# Patient Record
Sex: MALE | Race: BLACK OR AFRICAN AMERICAN | Employment: OTHER | URBAN - METROPOLITAN AREA
[De-identification: names, ages, dates, MRNs, and addresses within clinical notes are randomized per-mention and may not be internally consistent; named-entity substitution may affect disease eponyms.]

---

## 2017-06-22 ENCOUNTER — HOSPITAL ENCOUNTER (OUTPATIENT)
Dept: MRI IMAGING | Age: 67
Discharge: HOME | End: 2017-06-22
Attending: ORTHOPAEDIC SURGERY
Payer: MEDICARE

## 2017-06-22 DIAGNOSIS — M25.511 RIGHT SHOULDER PAIN: ICD-10-CM

## 2017-06-22 PROCEDURE — 73221 MRI JOINT UPR EXTREM W/O DYE: CPT

## 2019-08-29 ENCOUNTER — OP HISTORICAL/CONVERTED ENCOUNTER (OUTPATIENT)
Dept: OTHER | Age: 69
End: 2019-08-29

## 2020-05-18 ENCOUNTER — HOSPITAL ENCOUNTER (OUTPATIENT)
Dept: GENERAL RADIOLOGY | Age: 70
Discharge: HOME | End: 2020-05-18
Payer: MEDICARE

## 2020-05-18 DIAGNOSIS — R52 PAIN: ICD-10-CM

## 2020-05-18 DIAGNOSIS — R60.9 SWELLING: ICD-10-CM

## 2020-05-18 PROCEDURE — 73130 X-RAY EXAM OF HAND: CPT

## 2023-08-09 ENCOUNTER — HOSPITAL ENCOUNTER (EMERGENCY)
Facility: HOSPITAL | Age: 73
Discharge: HOME | End: 2023-08-09
Attending: EMERGENCY MEDICINE
Payer: MEDICARE

## 2023-08-09 ENCOUNTER — APPOINTMENT (OUTPATIENT)
Facility: HOSPITAL | Age: 73
End: 2023-08-09
Payer: MEDICARE

## 2023-08-09 VITALS
TEMPERATURE: 98.6 F | WEIGHT: 260 LBS | HEART RATE: 83 BPM | OXYGEN SATURATION: 98 % | BODY MASS INDEX: 38.51 KG/M2 | RESPIRATION RATE: 18 BRPM | DIASTOLIC BLOOD PRESSURE: 76 MMHG | HEIGHT: 69 IN | SYSTOLIC BLOOD PRESSURE: 165 MMHG

## 2023-08-09 DIAGNOSIS — K59.00 CONSTIPATION, UNSPECIFIED CONSTIPATION TYPE: Primary | ICD-10-CM

## 2023-08-09 PROCEDURE — 99283 EMERGENCY DEPT VISIT LOW MDM: CPT

## 2023-08-09 PROCEDURE — 74018 RADEX ABDOMEN 1 VIEW: CPT

## 2023-08-09 PROCEDURE — 6370000000 HC RX 637 (ALT 250 FOR IP): Performed by: EMERGENCY MEDICINE

## 2023-08-09 RX ORDER — BISACODYL 10 MG
10 SUPPOSITORY, RECTAL RECTAL DAILY
Qty: 10 SUPPOSITORY | Refills: 0 | Status: SHIPPED | OUTPATIENT
Start: 2023-08-09 | End: 2023-08-19

## 2023-08-09 RX ORDER — ENEMA 19; 7 G/133ML; G/133ML
1 ENEMA RECTAL ONCE
Status: COMPLETED | OUTPATIENT
Start: 2023-08-09 | End: 2023-08-09

## 2023-08-09 RX ADMIN — SODIUM PHOSPHATE, DIBASIC AND SODIUM PHOSPHATE, MONOBASIC 1 ENEMA: 7; 19 ENEMA RECTAL at 22:23

## 2023-08-09 ASSESSMENT — PAIN SCALES - GENERAL: PAINLEVEL_OUTOF10: 8

## 2023-08-09 ASSESSMENT — LIFESTYLE VARIABLES
HOW MANY STANDARD DRINKS CONTAINING ALCOHOL DO YOU HAVE ON A TYPICAL DAY: PATIENT DOES NOT DRINK
HOW OFTEN DO YOU HAVE A DRINK CONTAINING ALCOHOL: NEVER

## 2023-08-09 ASSESSMENT — PAIN DESCRIPTION - LOCATION: LOCATION: RECTUM

## 2023-08-09 ASSESSMENT — PAIN - FUNCTIONAL ASSESSMENT: PAIN_FUNCTIONAL_ASSESSMENT: 0-10

## 2023-08-09 ASSESSMENT — PAIN DESCRIPTION - DESCRIPTORS: DESCRIPTORS: PRESSURE

## 2023-08-09 NOTE — ED TRIAGE NOTES
Patient arrives to ed via pov with c/o unable to get bowel movement out. Pt sts he has been having constipation issues and was placed on a laxative for the last month but had been weaning self off this weekend. Pt sts he feels like its there but cant get it out. Pt sts he has been passing gas still. Pt sts he is having rectal pain. Pt denies blood in stool. Sts LBM was 3 days ago.

## 2023-08-10 NOTE — DISCHARGE INSTRUCTIONS
Please restart your prior regimen for constipation as prescribed by your PCP. You can use Dulcolax suppositories as needed until you have a bowel movement. Thank you.

## 2023-08-10 NOTE — ED NOTES
Pt given discharge instructions, patient education, prescriptions, and follow up information. Pt verbalizes understanding. All questions answered. Patient discharged to home in private vehicle, ambulatory. Pt A&Ox4, RA, pain controlled.       Segundo Noyola RN  08/09/23 [id number]

## 2024-10-17 ENCOUNTER — TRANSCRIBE ORDERS (OUTPATIENT)
Facility: HOSPITAL | Age: 74
End: 2024-10-17

## 2024-10-17 DIAGNOSIS — I89.0 LYMPHEDEMA: Primary | ICD-10-CM

## 2025-02-04 ENCOUNTER — HOSPITAL ENCOUNTER (OUTPATIENT)
Facility: HOSPITAL | Age: 75
Discharge: HOME | End: 2025-02-07
Payer: MEDICARE

## 2025-02-04 DIAGNOSIS — I47.29 NONSUSTAINED VENTRICULAR TACHYCARDIA (HCC): ICD-10-CM

## 2025-02-04 DIAGNOSIS — I50.31 ACUTE HEART FAILURE WITH PRESERVED EJECTION FRACTION (HCC): ICD-10-CM

## 2025-02-04 DIAGNOSIS — I50.22 CHRONIC SYSTOLIC CONGESTIVE HEART FAILURE (HCC): ICD-10-CM

## 2025-02-04 DIAGNOSIS — I11.9 MALIGNANT HYPERTENSIVE HEART DISEASE WITHOUT HEART FAILURE: ICD-10-CM

## 2025-02-04 PROCEDURE — A9538 TC99M PYROPHOSPHATE: HCPCS | Performed by: STUDENT IN AN ORGANIZED HEALTH CARE EDUCATION/TRAINING PROGRAM

## 2025-02-04 PROCEDURE — 3430000000 HC RX DIAGNOSTIC RADIOPHARMACEUTICAL: Performed by: STUDENT IN AN ORGANIZED HEALTH CARE EDUCATION/TRAINING PROGRAM

## 2025-02-04 PROCEDURE — 78803 RP LOCLZJ TUM SPECT 1 AREA: CPT

## 2025-02-04 RX ORDER — TECHNETIUM TC99M PYROPHOSPHATE 12 MG/10ML
15 INJECTION INTRAVENOUS ONCE
Status: COMPLETED | OUTPATIENT
Start: 2025-02-04 | End: 2025-02-04

## 2025-02-04 RX ADMIN — TECHNETIUM TC99M PYROPHOSPHATE 15 MILLICURIE: 12 INJECTION INTRAVENOUS at 10:27

## 2025-03-03 ENCOUNTER — HOSPITAL ENCOUNTER (OUTPATIENT)
Facility: HOSPITAL | Age: 75
Setting detail: RECURRING SERIES
Discharge: HOME | End: 2025-03-06
Payer: MEDICARE

## 2025-03-03 PROCEDURE — 97140 MANUAL THERAPY 1/> REGIONS: CPT

## 2025-03-03 PROCEDURE — 97535 SELF CARE MNGMENT TRAINING: CPT

## 2025-03-03 PROCEDURE — 97162 PT EVAL MOD COMPLEX 30 MIN: CPT

## 2025-03-03 NOTE — THERAPY EVALUATION
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 []   I87.2 Venous Insufficiency   [x]   I89.0 Lymphedema, other secondary  []   I83.019 Venous Stasis Ulcer LE, Right   []   I89.9 Unspecified Lymphatic Disorder []   I83.029 Venous Stasis Ulcer LE, Left   [x]   R60.9 Swelling not relieved by elevation []   Q82.0 Hereditary/ Congenital Lymphedema   []   C50.211 Malignant neoplasm of breast, Right []   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   []   C50.212 Malignant neoplasm of breast, Left []   L03.115 LE Cellulitis, Right   []    []   L03.116 LE Cellulitis, Left                                                           Yoav Ghosh    1950  Page 2 of 2    Physician Order for DME for Diagnosis of Secondary Lymphedema as Listed on Statement of Medical Necessity, Page 1        Recommended Product:  Units Upper Extremity Rt Lt Units Lower Extremity Rt Lt    Circ-Aid, Ready Wrap, Sigvaris Arm   4 Inelastic binders (Circ-Aid, Farrow)  [x]Foot   [x]Below Knee   []Knee   []Thigh x x    Circ-Aid Ready Wrap, Sigvaris hand    Elpidio Bradford, night use []Full Leg  []Lower Leg      Tribute Arm, night use   2 Tribute, night use  [x]Full Leg  []Lower Leg x x    Elpidio Bradford Arm, night use    Austyn Sleeve Leg/ Foot, night use      Gradiant Compression Sleeves & Gloves  []Custom [] RM Arm:  []CCL1 []CCL2 []CCL3  []Custom [] RM Glove: []CCL1 []CCL2 []CCL3     6 Gradient Compression Stockings   [x]Custom  []RM Lower Extremity:   []CCL1       [x]CCL2         []CCL3   [x]Knee       []Thigh        []Waist Length x x    Austyn sleeve arm w/ hand, night use    Tribute Wrap, night use      Compression Bra          Compression Vest         The above patient was referred for treatment of Lymphedema due to 
must be completed for valid certification **  Please sign and fax to [phone].  Thank you

## 2025-03-07 ENCOUNTER — TRANSCRIBE ORDERS (OUTPATIENT)
Facility: HOSPITAL | Age: 75
End: 2025-03-07

## 2025-03-07 DIAGNOSIS — I50.30 HEART FAILURE WITH PRESERVED EJECTION FRACTION, UNSPECIFIED HF CHRONICITY (HCC): Primary | ICD-10-CM

## 2025-03-14 ENCOUNTER — HOSPITAL ENCOUNTER (OUTPATIENT)
Facility: HOSPITAL | Age: 75
Discharge: HOME | End: 2025-03-16
Payer: MEDICARE

## 2025-03-14 DIAGNOSIS — I50.30 HEART FAILURE WITH PRESERVED EJECTION FRACTION, UNSPECIFIED HF CHRONICITY (HCC): ICD-10-CM

## 2025-03-14 LAB
ECHO AO ASC DIAM: 2.4 CM
ECHO AO ROOT DIAM: 2.5 CM
ECHO LA DIAMETER: 3.6 CM
ECHO LA TO AORTIC ROOT RATIO: 1.44
ECHO LV E' LATERAL VELOCITY: 3.15 CM/S
ECHO LV E' SEPTAL VELOCITY: 4.35 CM/S
ECHO LV EDV A2C: 31 ML
ECHO LV EF PHYSICIAN: 67 %
ECHO LV EJECTION FRACTION A2C: 45 %
ECHO LV ESV A2C: 17 ML
ECHO LV FRACTIONAL SHORTENING: 28 % (ref 28–44)
ECHO LV INTERNAL DIMENSION DIASTOLIC: 4.6 CM (ref 4.2–5.9)
ECHO LV INTERNAL DIMENSION SYSTOLIC: 3.3 CM
ECHO LV IVSD: 1.4 CM (ref 0.6–1)
ECHO LV MASS 2D: 270.6 G (ref 88–224)
ECHO LV POSTERIOR WALL DIASTOLIC: 1.5 CM (ref 0.6–1)
ECHO LV RELATIVE WALL THICKNESS RATIO: 0.65
ECHO LVOT AREA: 5.7 CM2
ECHO LVOT DIAM: 2.7 CM
ECHO LVOT MEAN GRADIENT: 1 MMHG
ECHO LVOT PEAK GRADIENT: 3 MMHG
ECHO LVOT PEAK VELOCITY: 0.9 M/S
ECHO LVOT SV: 133.9 ML
ECHO LVOT VTI: 23.4 CM
ECHO MV A VELOCITY: 1.25 M/S
ECHO MV E DECELERATION TIME (DT): 215 MS
ECHO MV E VELOCITY: 0.99 M/S
ECHO MV E/A RATIO: 0.79
ECHO MV E/E' LATERAL: 31.43
ECHO MV E/E' RATIO (AVERAGED): 27.09
ECHO MV E/E' SEPTAL: 22.76
ECHO MV REGURGITANT PEAK GRADIENT: 3 MMHG
ECHO MV REGURGITANT PEAK VELOCITY: 0.9 M/S
ECHO MV REGURGITANT VTIA: 25.9 CM
ECHO PV MAX VELOCITY: 1 M/S
ECHO PV MEAN GRADIENT: 2 MMHG
ECHO PV MEAN VELOCITY: 0.7 M/S
ECHO PV PEAK GRADIENT: 4 MMHG
ECHO PV VTI: 17.9 CM
ECHO RV BASAL DIMENSION: 3.1 CM
ECHO RV MID DIMENSION: 2.3 CM
ECHO TV REGURGITANT MAX VELOCITY: 1.95 M/S
ECHO TV REGURGITANT PEAK GRADIENT: 15 MMHG

## 2025-03-14 PROCEDURE — C8929 TTE W OR WO FOL WCON,DOPPLER: HCPCS

## 2025-03-14 PROCEDURE — 6360000004 HC RX CONTRAST MEDICATION

## 2025-03-14 RX ADMIN — SULFUR HEXAFLUORIDE 2 ML: 60.7; .19; .19 INJECTION, POWDER, LYOPHILIZED, FOR SUSPENSION INTRAVENOUS; INTRAVESICAL at 16:32

## 2025-03-28 ENCOUNTER — HOSPITAL ENCOUNTER (OUTPATIENT)
Facility: HOSPITAL | Age: 75
Setting detail: RECURRING SERIES
Discharge: HOME | End: 2025-03-31
Payer: MEDICARE

## 2025-03-28 PROCEDURE — 97140 MANUAL THERAPY 1/> REGIONS: CPT

## 2025-03-28 NOTE — PROGRESS NOTES
PHYSICAL THERAPY/OCCUPATIONAL THERAPY - MEDICARE DAILY TREATMENT NOTE (updated 3/23)      Date: 3/28/2025          Patient Name:  Yoav Ghosh :  1950   Medical   Diagnosis:  No admission diagnoses are documented for this encounter. Treatment Diagnosis:  I89.0     Lymphedema, not elsewhere classified and R60.9     Edema, unspecified    Referral Source:  Faustino Campbell* Insurance:   Payor: [facility] MEDICARE / Plan: [facility] HEALTHKEEPERS MEDIBLUE PLUS / Product Type: *No Product type* /                     Patient  verified yes     Visit #   Current  / Total 2 24 per POC   Time   In / Out 1220 1320   Total Treatment Time 60   Total Timed Codes 60   1:1 Treatment Time 60      [facility] Totals Reminder:  bill using total billable   min of TIMED therapeutic procedures and modalities.   8-22 min = 1 unit; 23-37 min = 2 units; 38-52 min = 3 units;  53-67 min = 4 units; 68-82 min = 5 units         SUBJECTIVE    Pain Level (0-10 scale): pt denies pain at rest    Any medication changes, allergies to medications, adverse drug reactions, diagnosis change, or new procedure performed?: [x] No    [] Yes (see summary sheet for update)  Medications: Verified on Patient Summary List    Subjective functional status/changes:     Pt arrives to appt accompanied by his wife. Pt received bandages and agreeable to initiate bandaging treatment today. Pt has continued to wear OTS knee high stockings but feels legs are more swollen.     OBJECTIVE      Therapeutic Procedures:  Tx Min Billable or 1:1 Min (if diff from Tx Min) Procedure, Rationale, Specifics   60  [id number] Manual Therapy (timed):  decrease pain, increase ROM, increase tissue extensibility, and decrease edema to improve patient's ability to progress to PLOF and address remaining functional goals.  The manual therapy interventions were performed at a separate and distinct time from the therapeutic activities interventions . (see flow sheet as

## 2025-04-02 ENCOUNTER — HOSPITAL ENCOUNTER (OUTPATIENT)
Facility: HOSPITAL | Age: 75
Setting detail: RECURRING SERIES
End: 2025-04-02
Payer: MEDICARE

## 2025-04-04 ENCOUNTER — HOSPITAL ENCOUNTER (OUTPATIENT)
Facility: HOSPITAL | Age: 75
Setting detail: RECURRING SERIES
Discharge: HOME | End: 2025-04-07
Payer: MEDICARE

## 2025-04-04 PROCEDURE — 97140 MANUAL THERAPY 1/> REGIONS: CPT

## 2025-04-04 NOTE — PROGRESS NOTES
[facility]   a part of [facility]  [address], [address]   [place] [address]   Phone: [phone]  Fax: [phone]      PHYSICAL THERAPY PROGRESS NOTE  Patient Name:  Yoav Ghosh :  1950   Treatment/Medical Diagnosis: No admission diagnoses are documented for this encounter.   Referral Source:  Faustino Campbell*     Date of Initial Visit:  3/3/25 Attended Visits:  3 Missed Visits:  0     SUMMARY OF TREATMENT/ASSESSMENT:  Pt with good tolerance to MLD and bilateral LE knee high bandaging this visit. No SOB noted and pt reports bandages \"felt good\" after application. Note reduction in ankle girth measurements by 4 cm this visit. Reviewed bandaging precautions and advised pt to remove bandages with any increased SOB, pain, numbness/tingling. Will continue MLB 2x/week and measure for garments once volumes reach a plateau. Pt is progressing towards all PT goals.     CURRENT STATUS/GOALS    Patient will demonstrate decreased volumetric measurements by 800 ml bilateral lower extremities, in order to reduce risk for infection, decrease feeling of limb heaviness, and increase independence/tolerance for walking x 10 min within 6 weeks.   Status at last Eval/Progress Note: not met   Current Status: ongoing   Goal Met?  no    2.  Patient to perform 5/5 lymphedema remedial exercises in session with modified independence utilizing HEP handout, in order to promote optimal independence with management of condition, as well as promote optimal limb volume reduction required for proper fit of donned clothing in 6 weeks.   Status at last Eval/Progress Note: not met   Current Status: ongoing   Goal Met?  no    3. Patient/Caregiver to verbalize 3/3 signs and symptoms of infection without external cueing, in order to promote optimal self-management of condition in 6 weeks.   Status at last Eval/Progress Note: not met   Current Status: ongoing   Goal Met?  no    4. 
bandaging/garment precautions reviewed: [x] Yes  [] No      Other Objective/Functional Measures  Circumferences:      Lower Extremity Girth (cm):  Right     Left       MTP: 26.6 25.2   Midfoot/navicular: 27.4 26.2   Ankle: 30.8 30.6   Calf     (35 cm from floor) 41.0 39.8   Knee   (patella) NT NT   Thigh  (59 cm from floor) NT NT   Groin: NT NT        Height:    Weight:     BMI:    (36 or greater: adversely affecting lymphedema)        Pain Level at end of session (0-10 scale): 0/10      Assessment   Pt with good tolerance to MLD and bilateral LE knee high bandaging this visit. No SOB noted and pt reports bandages \"felt good\" after application. Note reduction in ankle girth measurements by 4 cm this visit. Reviewed bandaging precautions and advised pt to remove bandages with any increased SOB, pain, numbness/tingling. Will continue MLB 2x/week and measure for garments once volumes reach a plateau.   Patient will continue to benefit from skilled PT / OT services to address swelling, analyze and address soft tissue restrictions, analyze compression product fit and use, instruct in home and community integration, instruct in home lymphedema management program, measure for compression products, and modify and progress therapeutic interventions to address functional deficits and attain remaining goals.    Progress toward goals / Updated goals:  []  See Progress Note/Recertification    Short Term Goals: To be accomplished in 12 treatments.  Patient will demonstrate decreased volumetric measurements by 800 ml bilateral lower extremities, in order to reduce risk for infection, decrease feeling of limb heaviness, and increase independence/tolerance for walking x 10 min within 6 weeks. Not met, ongoing   2.   Patient to perform 5/5 lymphedema remedial exercises in session with modified independence utilizing HEP handout, in order to promote optimal independence with management of condition, as well as promote optimal limb

## 2025-04-09 ENCOUNTER — HOSPITAL ENCOUNTER (OUTPATIENT)
Facility: HOSPITAL | Age: 75
Setting detail: RECURRING SERIES
Discharge: HOME | End: 2025-04-12
Payer: MEDICARE

## 2025-04-09 PROCEDURE — 97140 MANUAL THERAPY 1/> REGIONS: CPT

## 2025-04-09 NOTE — PROGRESS NOTES
PHYSICAL THERAPY/OCCUPATIONAL THERAPY - MEDICARE DAILY TREATMENT NOTE (updated 3/23)      Date: 2025          Patient Name:  Yoav Ghosh :  1950   Medical   Diagnosis:  No admission diagnoses are documented for this encounter. Treatment Diagnosis:  I89.0     Lymphedema, not elsewhere classified and R60.9     Edema, unspecified    Referral Source:  Faustino Campbell* Insurance:   Payor: [facility] MEDICARE / Plan: [facility] HEALTHKEEPERS MEDIBLUE PLUS / Product Type: *No Product type* /                     Patient  verified yes     Visit #   Current  / Total 4 24 per POC   Time   In / Out 1050 1200   Total Treatment Time 70   Total Timed Codes 70   1:1 Treatment Time 70      [facility] Totals Reminder:  bill using total billable   min of TIMED therapeutic procedures and modalities.   8-22 min = 1 unit; 23-37 min = 2 units; 38-52 min = 3 units;  53-67 min = 4 units; 68-82 min = 5 units         SUBJECTIVE    Pain Level (0-10 scale): pt denies pain at rest    Any medication changes, allergies to medications, adverse drug reactions, diagnosis change, or new procedure performed?: [x] No    [] Yes (see summary sheet for update)  Medications: Verified on Patient Summary List    Subjective functional status/changes:     Pt arrives to appt alone, MLB intact B LE. Pt reports good tolerance to bandages, however says bandages slid off on right foot this morning and he had to tuck it in to prevent it from unraveling more. Pt thinks he slid his food in/out of his shoe without undoing the velcro which caused it to slide. Pt/wife state that they will continue with visits 2x/week, said daughter and pt prefer for therapist to continue wrapping vs them bandaging at home. Pt agreeable to treatment.      OBJECTIVE      Therapeutic Procedures:  Tx Min Billable or 1:1 Min (if diff from Tx Min) Procedure, Rationale, Specifics   47 05740 Manual Therapy (timed):  decrease pain, increase ROM, increase tissue

## 2025-04-11 ENCOUNTER — HOSPITAL ENCOUNTER (OUTPATIENT)
Facility: HOSPITAL | Age: 75
Setting detail: RECURRING SERIES
Discharge: HOME | End: 2025-04-14
Payer: MEDICARE

## 2025-04-11 PROCEDURE — 97140 MANUAL THERAPY 1/> REGIONS: CPT

## 2025-04-11 NOTE — PROGRESS NOTES
PHYSICAL THERAPY/OCCUPATIONAL THERAPY - MEDICARE DAILY TREATMENT NOTE (updated 3/23)      Date: 2025          Patient Name:  Yoav Ghosh :  1950   Medical   Diagnosis:  No admission diagnoses are documented for this encounter. Treatment Diagnosis:  I89.0     Lymphedema, not elsewhere classified and R60.9     Edema, unspecified    Referral Source:  Faustino Campbell* Insurance:   Payor: [facility] MEDICARE / Plan: [facility] HEALTHKEEPERS MEDIBLUE PLUS / Product Type: *No Product type* /                     Patient  verified yes     Visit #   Current  / Total 5 24 per POC   Time   In / Out 0986 0989   Total Treatment Time 40   Total Timed Codes 40   1:1 Treatment Time 40      [facility] Totals Reminder:  bill using total billable   min of TIMED therapeutic procedures and modalities.   8-22 min = 1 unit; 23-37 min = 2 units; 38-52 min = 3 units;  53-67 min = 4 units; 68-82 min = 5 units         SUBJECTIVE    Pain Level (0-10 scale): pt denies pain at rest    Any medication changes, allergies to medications, adverse drug reactions, diagnosis change, or new procedure performed?: [x] No    [] Yes (see summary sheet for update)  Medications: Verified on Patient Summary List    Subjective functional status/changes:     Pt arrives to appt accompanied by his wife, CHUCK HASKINS. Pt requests MLB only, says he has an MD appt that was scheduled last minute to discuss results of tests performed last week. Pt reports good tolerance to bandages, says they did not slide off his feet like last time. Pt/wife state that they will continue with visits 2x/week, said daughter and pt prefer for therapist to continue wrapping vs them bandaging at home. Pt agreeable to treatment.      OBJECTIVE      Therapeutic Procedures:  Tx Min Billable or 1:1 Min (if diff from Tx Min) Procedure, Rationale, Specifics   40  09995 Manual Therapy (timed):  decrease pain, increase ROM, increase tissue extensibility, and decrease edema

## 2025-04-15 ENCOUNTER — HOSPITAL ENCOUNTER (OUTPATIENT)
Facility: HOSPITAL | Age: 75
Setting detail: RECURRING SERIES
Discharge: HOME | End: 2025-04-18
Payer: MEDICARE

## 2025-04-15 PROCEDURE — 97140 MANUAL THERAPY 1/> REGIONS: CPT

## 2025-04-15 NOTE — PROGRESS NOTES
PHYSICAL THERAPY/OCCUPATIONAL THERAPY - MEDICARE DAILY TREATMENT NOTE (updated 3/23)      Date: 4/15/2025          Patient Name:  Yoav Ghosh :  1950   Medical   Diagnosis:  No admission diagnoses are documented for this encounter. Treatment Diagnosis:  I89.0     Lymphedema, not elsewhere classified and R60.9     Edema, unspecified    Referral Source:  Faustino Campbell* Insurance:   Payor: [facility] MEDICARE / Plan: [facility] HEALTHKEEPERS MEDIBLUE PLUS / Product Type: *No Product type* /                     Patient  verified yes     Visit #   Current  / Total 5 24 per POC   Time   In / Out 1007 1112   Total Treatment Time 65   Total Timed Codes 65   1:1 Treatment Time 65      [facility] Totals Reminder:  bill using total billable   min of TIMED therapeutic procedures and modalities.   8-22 min = 1 unit; 23-37 min = 2 units; 38-52 min = 3 units;  53-67 min = 4 units; 68-82 min = 5 units         SUBJECTIVE    Pain Level (0-10 scale): pt denies pain at rest    Any medication changes, allergies to medications, adverse drug reactions, diagnosis change, or new procedure performed?: [x] No    [] Yes (see summary sheet for update)  Medications: Verified on Patient Summary List    Subjective functional status/changes:     Pt arrives to appt accompanied by his wife, CHUCK HASKINS.  Pt reports good tolerance to bandages, says they did not slide off his feet. Pt and wife pleased with how his legs are looking.     OBJECTIVE      Therapeutic Procedures:  Tx Min Billable or 1:1 Min (if diff from Tx Min) Procedure, Rationale, Specifics   65  20654 Manual Therapy (timed):  decrease pain, increase ROM, increase tissue extensibility, and decrease edema to improve patient's ability to progress to PLOF and address remaining functional goals.  The manual therapy interventions were performed at a separate and distinct time from the therapeutic activities interventions . (see flow sheet as applicable)    Details if

## 2025-04-18 ENCOUNTER — HOSPITAL ENCOUNTER (OUTPATIENT)
Facility: HOSPITAL | Age: 75
Setting detail: RECURRING SERIES
Discharge: HOME | End: 2025-04-21
Payer: MEDICARE

## 2025-04-18 PROCEDURE — 97140 MANUAL THERAPY 1/> REGIONS: CPT

## 2025-04-18 NOTE — PROGRESS NOTES
PHYSICAL THERAPY/OCCUPATIONAL THERAPY - MEDICARE DAILY TREATMENT NOTE (updated 3/23)      Date: 2025          Patient Name:  Yoav Ghosh :  1950   Medical   Diagnosis:  No admission diagnoses are documented for this encounter. Treatment Diagnosis:  I89.0     Lymphedema, not elsewhere classified and R60.9     Edema, unspecified    Referral Source:  Faustino Campbell* Insurance:   Payor: [facility] MEDICARE / Plan: [facility] HEALTHKEEPERS MEDIBLUE PLUS / Product Type: *No Product type* /                     Patient  verified yes     Visit #   Current  / Total 7 24 per POC   Time   In / Out 0850 0950   Total Treatment Time 60   Total Timed Codes 60   1:1 Treatment Time 60      [facility] Totals Reminder:  bill using total billable   min of TIMED therapeutic procedures and modalities.   8-22 min = 1 unit; 23-37 min = 2 units; 38-52 min = 3 units;  53-67 min = 4 units; 68-82 min = 5 units         SUBJECTIVE    Pain Level (0-10 scale): pt denies pain at rest    Any medication changes, allergies to medications, adverse drug reactions, diagnosis change, or new procedure performed?: [x] No    [] Yes (see summary sheet for update)  Medications: Verified on Patient Summary List    Subjective functional status/changes:     Pt arrives to appt accompanied by his wife, CHUCK HASKINS.  Pt reports good tolerance to bandages, says they started to slide off of his feet a little bit. Pt and wife pleased with how his legs are looking. Pt agreeable to treatment.     OBJECTIVE      Therapeutic Procedures:  Tx Min Billable or 1:1 Min (if diff from Tx Min) Procedure, Rationale, Specifics   60  [id number] Manual Therapy (timed):  decrease pain, increase ROM, increase tissue extensibility, and decrease edema to improve patient's ability to progress to PLOF and address remaining functional goals.  The manual therapy interventions were performed at a separate and distinct time from the therapeutic activities interventions .

## 2025-04-22 ENCOUNTER — HOSPITAL ENCOUNTER (OUTPATIENT)
Facility: HOSPITAL | Age: 75
Setting detail: RECURRING SERIES
Discharge: HOME | End: 2025-04-25
Payer: MEDICARE

## 2025-04-22 PROCEDURE — 97140 MANUAL THERAPY 1/> REGIONS: CPT

## 2025-04-22 NOTE — PROGRESS NOTES
PHYSICAL THERAPY/OCCUPATIONAL THERAPY - MEDICARE DAILY TREATMENT NOTE (updated 3/23)      Date: 2025          Patient Name:  Yoav Ghosh :  1950   Medical   Diagnosis:  No admission diagnoses are documented for this encounter. Treatment Diagnosis:  I89.0     Lymphedema, not elsewhere classified and R60.9     Edema, unspecified    Referral Source:  Faustino Campbell* Insurance:   Payor: [facility] MEDICARE / Plan: [facility] HEALTHKEEPERS MEDIBLUE PLUS / Product Type: *No Product type* /                     Patient  verified yes     Visit #   Current  / Total 8 24 per POC   Time   In / Out 0952 1100   Total Treatment Time 68   Total Timed Codes 68   1:1 Treatment Time 68      [facility] Totals Reminder:  bill using total billable   min of TIMED therapeutic procedures and modalities.   8-22 min = 1 unit; 23-37 min = 2 units; 38-52 min = 3 units;  53-67 min = 4 units; 68-82 min = 5 units         SUBJECTIVE    Pain Level (0-10 scale): pt denies pain at rest    Any medication changes, allergies to medications, adverse drug reactions, diagnosis change, or new procedure performed?: [x] No    [] Yes (see summary sheet for update)  Medications: Verified on Patient Summary List    Subjective functional status/changes:     Pt arrives to appt accompanied by his wife, CHUCK HASKINS.  Pt reports good tolerance to bandages, Pt and wife remain pleased with how his legs are looking. Pt agreeable to treatment.     OBJECTIVE      Therapeutic Procedures:  Tx Min Billable or 1:1 Min (if diff from Tx Min) Procedure, Rationale, Specifics   68  50184 Manual Therapy (timed):  decrease pain, increase ROM, increase tissue extensibility, and decrease edema to improve patient's ability to progress to PLOF and address remaining functional goals.  The manual therapy interventions were performed at a separate and distinct time from the therapeutic activities interventions . (see flow sheet as applicable)    Details if

## 2025-04-25 ENCOUNTER — HOSPITAL ENCOUNTER (OUTPATIENT)
Facility: HOSPITAL | Age: 75
Setting detail: RECURRING SERIES
Discharge: HOME | End: 2025-04-28
Payer: MEDICARE

## 2025-04-25 PROCEDURE — 97140 MANUAL THERAPY 1/> REGIONS: CPT

## 2025-04-25 PROCEDURE — 97760 ORTHOTIC MGMT&TRAING 1ST ENC: CPT

## 2025-04-28 ENCOUNTER — HOSPITAL ENCOUNTER (OUTPATIENT)
Facility: HOSPITAL | Age: 75
Setting detail: RECURRING SERIES
End: 2025-04-28
Payer: MEDICARE

## 2025-04-29 ENCOUNTER — HOSPITAL ENCOUNTER (OUTPATIENT)
Facility: HOSPITAL | Age: 75
Setting detail: RECURRING SERIES
Discharge: HOME | End: 2025-05-02
Payer: MEDICARE

## 2025-04-29 PROCEDURE — 97760 ORTHOTIC MGMT&TRAING 1ST ENC: CPT

## 2025-04-29 PROCEDURE — 97140 MANUAL THERAPY 1/> REGIONS: CPT

## 2025-04-29 NOTE — PROGRESS NOTES
Vasopneumatic Device:    Body Part: [] R [] L [] Bilateral  Pressure: [] Decreased [] Normal [] Increased  Treatment Cycles: [] 1  [] 2  [] 3   Rationale: To improve lymphatic fluid movement and decrease swelling to improve the patient’s ability to perform ADL and IADL skills and prevent worsening of swelling over time.    Skin assessment post-treatment (if applicable):    [x]  intact    []  redness- no adverse reaction                 []redness - adverse reaction:          Patient Education: [x] Review HEP    [x]  Patient Education billed concurrently with other procedures   [x] MLD Patient Education Continued education in self MLD technique with bathing and skin care  [] Progressed/Changed HEP based on:   [x] positioning   [] Kinesiotape   [] Skin care   [] wound care   [] other:   [x] Patient/caregiver in multi-layer bandaging donning principles., Precautions., Supply ordering and types of bandaging., and Handout provided.  Patient / caregiver re-demonstrated bandaging. [] Yes  [x] No  Compression bandaging/garment precautions reviewed: [x] Yes  [] No      Other Objective/Functional Measures  Circumferences:    (previous visit)   Lower Extremity Girth (cm):  Right     Left       MTP: 27.3 26.2   Midfoot/navicular: 29.8 28.0   Ankle: 30.8 29.2   Calf     (35 cm from floor) 42.5 41.2   Knee   (patella) NT NT   Thigh  (59 cm from floor) NT NT   Groin: NT NT        Volumetric Measurements:     Date:  Right Left % difference    4/25/25 10,496.71 9,938.81 5.61   3/3/25 10,649.34 9,719.82 9.56       Height:    Weight:     BMI:    (36 or greater: adversely affecting lymphedema)        Pain Level at end of session (0-10 scale): 0/10      Assessment   Pt with good tolerance to bilateral LE knee high bandaging this visit. Continued improvement in swelling at dorsal foot, noting tissue softening, skin less taught, improved shoe fit.  No SOB noted and pt reports bandages \"felt good\" after application. Reviewed bandaging

## 2025-05-01 ENCOUNTER — HOSPITAL ENCOUNTER (OUTPATIENT)
Facility: HOSPITAL | Age: 75
Setting detail: RECURRING SERIES
Discharge: HOME | End: 2025-05-04
Payer: MEDICARE

## 2025-05-01 PROCEDURE — 97140 MANUAL THERAPY 1/> REGIONS: CPT

## 2025-05-01 NOTE — PROGRESS NOTES
[facility]   a part of [facility]  [address], [address]   [place] [address]   Phone: [phone]  Fax: [phone]      PHYSICAL THERAPY PROGRESS NOTE  Patient Name:  Yoav Ghosh :  1950   Treatment/Medical Diagnosis: No admission diagnoses are documented for this encounter.   Referral Source:  Faustino Campbell*     Date of Initial Visit:  3/3/25 Attended Visits:  11 Missed Visits:  0     SUMMARY OF TREATMENT/ASSESSMENT:  Pt with good tolerance to continued MLD and bilateral LE knee high bandaging. Continued improvement in swelling at dorsal foot, noting tissue softening, skin less taught, improved shoe fit.  No SOB noted and pt reports bandages \"felt good\" after application. Reviewed bandaging precautions and advised pt to remove bandages with any increased SOB, pain, numbness/tingling. Pt measured for custom knee high stockings previous visit. Will continue MLB 2x/week until pt receives garments for fitting. Pt measured for daytime garments only due to cost since pt will have 20% coinsurance. Pt information previously submitted to [facility] to check insurance benefits for pump as well. Pt has met 2/6 PT goals and is progressing towards all goals.     CURRENT STATUS/GOALS    Patient will demonstrate decreased volumetric measurements by 800 ml bilateral lower extremities, in order to reduce risk for infection, decrease feeling of limb heaviness, and increase independence/tolerance for walking x 10 min within 6 weeks.   Status at last Eval/Progress Note: not met   Current Status: ongoing   Goal Met?  no    2.  Patient to perform 5/5 lymphedema remedial exercises in session with modified independence utilizing HEP handout, in order to promote optimal independence with management of condition, as well as promote optimal limb volume reduction required for proper fit of donned clothing in 6 weeks.   Status at last Eval/Progress Note: not met

## 2025-05-05 ENCOUNTER — HOSPITAL ENCOUNTER (OUTPATIENT)
Facility: HOSPITAL | Age: 75
Setting detail: RECURRING SERIES
Discharge: HOME | End: 2025-05-08
Payer: MEDICARE

## 2025-05-05 PROCEDURE — 97140 MANUAL THERAPY 1/> REGIONS: CPT

## 2025-05-05 NOTE — PROGRESS NOTES
PHYSICAL THERAPY/OCCUPATIONAL THERAPY - MEDICARE DAILY TREATMENT NOTE (updated 3/23)      Date: 2025          Patient Name:  Yoav Ghosh :  1950   Medical   Diagnosis:  No admission diagnoses are documented for this encounter. Treatment Diagnosis:  I89.0     Lymphedema, not elsewhere classified and R60.9     Edema, unspecified    Referral Source:  Faustino Campbell* Insurance:   Payor: [facility] MEDICARE / Plan: [facility] HEALTHKEEPERS MEDIBLUE PLUS / Product Type: *No Product type* /                     Patient  verified yes     Visit #   Current  / Total 1 24 per POC   Time   In / Out 1120 1207   Total Treatment Time 47   Total Timed Codes 47   1:1 Treatment Time 47      [facility] Totals Reminder:  bill using total billable   min of TIMED therapeutic procedures and modalities.   8-22 min = 1 unit; 23-37 min = 2 units; 38-52 min = 3 units;  53-67 min = 4 units; 68-82 min = 5 units         SUBJECTIVE    Pain Level (0-10 scale): pt denies pain at rest    Any medication changes, allergies to medications, adverse drug reactions, diagnosis change, or new procedure performed?: [x] No    [] Yes (see summary sheet for update)  Medications: Verified on Patient Summary List    Subjective functional status/changes:     Pt arrives to appt alone, MLB intact B LE.  Pt reports good tolerance to bandages. Pt pleased with progress. Pt agreeable to treatment.    OBJECTIVE      Therapeutic Procedures:  Tx Min Billable or 1:1 Min (if diff from Tx Min) Procedure, Rationale, Specifics   NP  24766 Orthotic Management and Training UE, LE and/or trunk, initial orthotic(s) encounter (timed): assessment and fitting to improve positioning of lower extremity during weight bearing and gait, improve pressure distribution of the plantar aspect of the foot, improve upper extremity performance, improve postural stability to improve patient's ability to progress to PLOF and address remaining functional goals.    Details if

## 2025-05-08 ENCOUNTER — HOSPITAL ENCOUNTER (OUTPATIENT)
Facility: HOSPITAL | Age: 75
Setting detail: RECURRING SERIES
Discharge: HOME | End: 2025-05-11
Payer: MEDICARE

## 2025-05-08 PROCEDURE — 97140 MANUAL THERAPY 1/> REGIONS: CPT

## 2025-05-08 PROCEDURE — 97166 OT EVAL MOD COMPLEX 45 MIN: CPT

## 2025-05-12 ENCOUNTER — HOSPITAL ENCOUNTER (OUTPATIENT)
Facility: HOSPITAL | Age: 75
Setting detail: RECURRING SERIES
Discharge: HOME | End: 2025-05-15
Payer: MEDICARE

## 2025-05-12 PROCEDURE — 97140 MANUAL THERAPY 1/> REGIONS: CPT

## 2025-05-12 NOTE — PROGRESS NOTES
adequate bandage in place that was applied by therapist previous visit.    The following multi-layer bandages were applied:  Protouch Stockinette    Padding layer:  Cellona    Foam:  12 cm roll  Gray foam to R dorsal foot  Added gray foam to L dorsal foot due to increased swelling from bandages sliding last visit     Short stretch bandages:   6 cm, 8 cm, and 12 cm    Applied tubigrip over taped areas and feet to help prevent sliding of bandages. Able to fit pt's shoes over bandages. Reviewed safe gait mechanics for fall prevention.     Compression bandaging instructions:  Compression bandaging will be applied twice a week by therapist in clinic, with adjustments to be made at home as indicated if bandaging becomes loose or uncomfortable.    If tolerated, remain bandaged between appointments with therapist, removing under the following conditions--DO NOT WAIT FOR A RETURN PHONE CALL FROM CLINIC:  -Numbness/tingling in extremity different from what you have experienced without the bandages in place.  -Compromise in circulation, monitoring blood refill into toes after applying gentle pressure to toes.  -Onset of pain in extremity that is sudden and severe in nature.  -Redness, warmth in limb, and/or fever, flu-like symptoms, which may indicate infection.  If this occurs, call your doctor right away.  If you note a sudden increase in swelling in extremity, with or without redness/warmth, go to the emergency room as soon as possible to have blood clot ruled out.    Compression bandaging supplies that can be laundered are the brown compression wraps and any foam applied for padding.  Launder in washer/dryer with NO fabric softener, bleach, woolite.  Dry on a low heat.    Once compression garments are ordered, compression bandaging will be continued until garments arrive for fitting.  This process can take several weeks.          32     Total Total       Modalities Rationale:     decrease edema, decrease inflammation,

## 2025-05-15 ENCOUNTER — HOSPITAL ENCOUNTER (OUTPATIENT)
Facility: HOSPITAL | Age: 75
Setting detail: RECURRING SERIES
Discharge: HOME | End: 2025-05-18
Payer: MEDICARE

## 2025-05-15 PROCEDURE — 97140 MANUAL THERAPY 1/> REGIONS: CPT

## 2025-05-15 NOTE — PROGRESS NOTES
bilateral lower legs.      Applied multi-layer compression bandaging to: Patient arrived with adequate bandage in place that was applied by therapist previous visit.    The following multi-layer bandages were applied:  Protouch Stockinette    Padding layer:  Cellona    Foam:  12 cm roll  Gray foam to R dorsal foot  Added gray foam to L dorsal foot due to increased swelling from bandages sliding last week    Short stretch bandages:   6 cm, 8 cm, and 12 cm    Applied tubigrip over taped areas and feet to help prevent sliding of bandages. Able to fit pt's shoes over bandages. Reviewed safe gait mechanics for fall prevention.     Compression bandaging instructions:  Compression bandaging will be applied twice a week by therapist in clinic, with adjustments to be made at home as indicated if bandaging becomes loose or uncomfortable.    If tolerated, remain bandaged between appointments with therapist, removing under the following conditions--DO NOT WAIT FOR A RETURN PHONE CALL FROM CLINIC:  -Numbness/tingling in extremity different from what you have experienced without the bandages in place.  -Compromise in circulation, monitoring blood refill into toes after applying gentle pressure to toes.  -Onset of pain in extremity that is sudden and severe in nature.  -Redness, warmth in limb, and/or fever, flu-like symptoms, which may indicate infection.  If this occurs, call your doctor right away.  If you note a sudden increase in swelling in extremity, with or without redness/warmth, go to the emergency room as soon as possible to have blood clot ruled out.    Compression bandaging supplies that can be laundered are the brown compression wraps and any foam applied for padding.  Launder in washer/dryer with NO fabric softener, bleach, woolite.  Dry on a low heat.    Once compression garments are ordered, compression bandaging will be continued until garments arrive for fitting.  This process can take several weeks.          55

## 2025-05-19 ENCOUNTER — HOSPITAL ENCOUNTER (OUTPATIENT)
Facility: HOSPITAL | Age: 75
Setting detail: RECURRING SERIES
Discharge: HOME | End: 2025-05-22
Payer: MEDICARE

## 2025-05-19 PROCEDURE — 97140 MANUAL THERAPY 1/> REGIONS: CPT

## 2025-05-19 NOTE — PROGRESS NOTES
Patient/Caregiver to verbalize 3/3 signs and symptoms of infection without external cueing, in order to promote optimal self-management of condition in 6 weeks. Met 4/29/25       Long Term Goals: To be accomplished in 24 treatments.  Patient/caregiver will demonstrate improved edema management as evidenced by performing donning/doffing of garments modified independent 3/3 times within session to aid in reducing risk for infection and promote transition to maintenance phase of CDT in 12 weeks.  2.   Patient will demonstrate decrease in self-perceived functional impairment as evidenced by improved score on Lymphedema Life Impact Scale outcome measure from 34% impairment to 24% impairment within 12 weeks.  3.   Patient and/or caregiver will demonstrate independence and compliance with home compression routine for continued volume reduction and prevention of re-accumulation of fluid during maintenance  phase of CDT within 12 weeks.       PLAN  Yes  Continue plan of care  Re-Cert Due: 6/1/25  PN due: 6/1/25  []  Upgrade activities as tolerated  []  Discharge due to:  [x]  Other: fit garments upon receiving      Dolores Padilla, PT, DPT, CLT       5/19/2025       9:27 AM

## 2025-05-20 ENCOUNTER — APPOINTMENT (OUTPATIENT)
Facility: HOSPITAL | Age: 75
End: 2025-05-20
Payer: MEDICARE

## 2025-05-22 ENCOUNTER — HOSPITAL ENCOUNTER (OUTPATIENT)
Facility: HOSPITAL | Age: 75
Setting detail: RECURRING SERIES
Discharge: HOME | End: 2025-05-25
Payer: MEDICARE

## 2025-05-22 PROCEDURE — 97140 MANUAL THERAPY 1/> REGIONS: CPT

## 2025-05-22 NOTE — PROGRESS NOTES
(36 or greater: adversely affecting lymphedema)        Pain Level at end of session (0-10 scale): 0/10      Assessment   Pt with good tolerance to bilateral LE knee high bandaging this visit. Continued improvement in swelling at dorsal foot, noting tissue softening, skin less taught, improved shoe fit. Left dorsal foot swelling improving, bandages stayed in place from last visit, continued to add gray foam. No SOB noted and pt reports bandages comfortable after application. Reviewed bandaging precautions and advised pt to remove bandages with any increased SOB, pain, numbness/tingling. Pt measured for custom knee high stockings 4/25. Will continue MLB 2x/week until pt receives garments for fitting. Pt measured for daytime garments only due to cost since pt will have 20% coinsurance. Pt information previously submitted to [facility] to check insurance benefits for pump as well.      Patient will continue to benefit from skilled PT / OT services to address swelling, analyze and address soft tissue restrictions, analyze compression product fit and use, instruct in home and community integration, instruct in home lymphedema management program, measure for compression products, and modify and progress therapeutic interventions to address functional deficits and attain remaining goals.    Progress toward goals / Updated goals:  []  See Progress Note/Recertification    Short Term Goals: To be accomplished in 12 treatments.  Patient will demonstrate decreased volumetric measurements by 800 ml bilateral lower extremities, in order to reduce risk for infection, decrease feeling of limb heaviness, and increase independence/tolerance for walking x 10 min within 6 weeks. Not met, ongoing   2.   Patient to perform 5/5 lymphedema remedial exercises in session with modified independence utilizing HEP handout, in order to promote optimal independence with management of condition, as well as promote optimal limb volume

## 2025-05-27 ENCOUNTER — HOSPITAL ENCOUNTER (OUTPATIENT)
Facility: HOSPITAL | Age: 75
Setting detail: RECURRING SERIES
Discharge: HOME | End: 2025-05-30
Payer: MEDICARE

## 2025-05-27 PROCEDURE — 97140 MANUAL THERAPY 1/> REGIONS: CPT

## 2025-05-27 NOTE — PROGRESS NOTES
PHYSICAL THERAPY/OCCUPATIONAL THERAPY - MEDICARE DAILY TREATMENT NOTE (updated 3/23)      Date: 2025          Patient Name:  Yoav Ghosh :  1950   Medical   Diagnosis:  No admission diagnoses are documented for this encounter. Treatment Diagnosis:  I89.0     Lymphedema, not elsewhere classified and R60.9     Edema, unspecified    Referral Source:  Faustino Campbell* Insurance:   Payor: [facility] MEDICARE / Plan: [facility] HEALTHKEEPERS MEDIBLUE PLUS / Product Type: *No Product type* /                     Patient  verified yes     Visit #   Current  / Total 17 24 per POC   Time   In / Out 1311 1355   Total Treatment Time 44   Total Timed Codes 44   1:1 Treatment Time 44      [facility] Totals Reminder:  bill using total billable   min of TIMED therapeutic procedures and modalities.   8-22 min = 1 unit; 23-37 min = 2 units; 38-52 min = 3 units;  53-67 min = 4 units; 68-82 min = 5 units         SUBJECTIVE    Pain Level (0-10 scale): pt denies pain at rest    Any medication changes, allergies to medications, adverse drug reactions, diagnosis change, or new procedure performed?: [x] No    [] Yes (see summary sheet for update)  Medications: Verified on Patient Summary List    Subjective functional status/changes:     Pt arrives to appt alone, MLB intact B LE.  Pt reports good tolerance to bandages, says they did not slide. Pt agreeable to treatment, says he has not yet heard anything from vendor regarding compression garments. Pt returned signed copy of CMN, sent to . Pt had appt with podiatrist earlier this week. Pt eager to transition to garments.     OBJECTIVE      Therapeutic Procedures:  Tx Min Billable or 1:1 Min (if diff from Tx Min) Procedure, Rationale, Specifics   NP  87685 Orthotic Management and Training UE, LE and/or trunk, initial orthotic(s) encounter (timed): assessment and fitting to improve positioning of lower extremity during weight bearing and gait, improve pressure

## 2025-05-29 ENCOUNTER — HOSPITAL ENCOUNTER (OUTPATIENT)
Facility: HOSPITAL | Age: 75
Setting detail: RECURRING SERIES
End: 2025-05-29
Payer: MEDICARE

## 2025-05-29 PROCEDURE — 97140 MANUAL THERAPY 1/> REGIONS: CPT

## 2025-05-29 NOTE — PROGRESS NOTES
PHYSICAL THERAPY/OCCUPATIONAL THERAPY - MEDICARE DAILY TREATMENT NOTE (updated 3/23)      Date: 2025          Patient Name:  Yoav Ghosh :  1950   Medical   Diagnosis:  No admission diagnoses are documented for this encounter. Treatment Diagnosis:  I89.0     Lymphedema, not elsewhere classified and R60.9     Edema, unspecified    Referral Source:  aFustino Campbell* Insurance:   Payor: [facility] MEDICARE / Plan: [facility] HEALTHKEEPERS MEDIBLUE PLUS / Product Type: *No Product type* /                     Patient  verified yes     Visit #   Current  / Total 18 24 per POC   Time   In / Out 0905 1000   Total Treatment Time 55   Total Timed Codes 55   1:1 Treatment Time 55      [facility] Totals Reminder:  bill using total billable   min of TIMED therapeutic procedures and modalities.   8-22 min = 1 unit; 23-37 min = 2 units; 38-52 min = 3 units;  53-67 min = 4 units; 68-82 min = 5 units         SUBJECTIVE    Pain Level (0-10 scale): pt denies pain at rest    Any medication changes, allergies to medications, adverse drug reactions, diagnosis change, or new procedure performed?: [x] No    [] Yes (see summary sheet for update)  Medications: Verified on Patient Summary List    Subjective functional status/changes:     Pt arrives to appt alone, MLB intact B LE.  Pt reports good tolerance to bandages, says they did not slide. Pt agreeable to treatment, says he has not yet heard anything from vendor regarding compression garments. Pt returned signed copy of CMN, sent to . Pt had appt with podiatrist earlier this week. Pt eager to transition to garments.     OBJECTIVE      Therapeutic Procedures:  Tx Min Billable or 1:1 Min (if diff from Tx Min) Procedure, Rationale, Specifics   NP  [id number] Orthotic Management and Training UE, LE and/or trunk, initial orthotic(s) encounter (timed): assessment and fitting to improve positioning of lower extremity during weight bearing and gait, improve pressure

## 2025-06-02 ENCOUNTER — HOSPITAL ENCOUNTER (OUTPATIENT)
Facility: HOSPITAL | Age: 75
Setting detail: RECURRING SERIES
Discharge: HOME | End: 2025-06-05
Payer: MEDICARE

## 2025-06-02 PROCEDURE — 97140 MANUAL THERAPY 1/> REGIONS: CPT

## 2025-06-02 NOTE — THERAPY RECERTIFICATION
[facility]   a part of [facility]  [address], [address]   [place] [address]   Phone: [phone]  Fax: [phone]      CONTINUED PLAN OF CARE/RECERTIFICATION FOR PHYSICAL THERAPY          Patient Name:              Yoav Ghosh :  1950   Treatment/Medical Diagnosis:  No admission diagnoses are documented for this encounter.   Onset Date:  2024    Referral Source:  Faustino Campbell* Start of Care (SOC):  3/3/25   Prior Hospitalization:  See Medical History Provider #:  [id number]      Prior Level of Function (PLOF):   independent, able to tolerate longer distance ambulation without fatigue/SOB    Comorbidities:  chronic kidney disease stage IV, anemia, HTN, proteinuria, CHF, type II DM    Medications:  Verified on Patient Summary List   Visits from SOC:  19 Missed Visits:  0     Progress toward Goals:  Short Term Goals: To be accomplished in 12 treatments.  Patient will demonstrate decreased volumetric measurements by 800 ml bilateral lower extremities, in order to reduce risk for infection, decrease feeling of limb heaviness, and increase independence/tolerance for walking x 10 min within 6 weeks. Not met, ongoing   2.   Patient to perform 5/5 lymphedema remedial exercises in session with modified independence utilizing HEP handout, in order to promote optimal independence with management of condition, as well as promote optimal limb volume reduction required for proper fit of donned clothing in 6 weeks. Met 25   3.   Patient/Caregiver to verbalize 3/3 signs and symptoms of infection without external cueing, in order to promote optimal self-management of condition in 6 weeks. Met 25       Long Term Goals: To be accomplished in 24 treatments.  Patient/caregiver will demonstrate improved edema management as evidenced by performing donning/doffing of garments modified independent 3/3 times within session to aid in reducing risk for

## 2025-06-02 NOTE — PROGRESS NOTES
PHYSICAL THERAPY/OCCUPATIONAL THERAPY - MEDICARE DAILY TREATMENT NOTE (updated 3/23)      Date: 2025          Patient Name:  Yoav Ghosh :  1950   Medical   Diagnosis:  No admission diagnoses are documented for this encounter. Treatment Diagnosis:  I89.0     Lymphedema, not elsewhere classified and R60.9     Edema, unspecified    Referral Source:  Faustino Campbell* Insurance:   Payor: [facility] MEDICARE / Plan: [facility] HEALTHKEEPERS MEDIBLUE PLUS / Product Type: *No Product type* /                     Patient  verified yes     Visit #   Current  / Total 19 24 per POC   Time   In / Out 1601 1480   Total Treatment Time 77   Total Timed Codes 77   1:1 Treatment Time 77      [facility] Totals Reminder:  bill using total billable   min of TIMED therapeutic procedures and modalities.   8-22 min = 1 unit; 23-37 min = 2 units; 38-52 min = 3 units;  53-67 min = 4 units; 68-82 min = 5 units         SUBJECTIVE    Pain Level (0-10 scale): pt denies pain at rest    Any medication changes, allergies to medications, adverse drug reactions, diagnosis change, or new procedure performed?: [x] No    [] Yes (see summary sheet for update)  Medications: Verified on Patient Summary List    Subjective functional status/changes:     Pt arrives to appt alone, MLB intact B LE.  Pt reports good tolerance to bandages, says they did not slide. Pt agreeable to treatment, says he has not yet heard anything from vendor regarding compression garments. Pt remains eager to transition to garments.     OBJECTIVE      Therapeutic Procedures:  Tx Min Billable or 1:1 Min (if diff from Tx Min) Procedure, Rationale, Specifics   NP  54677 Orthotic Management and Training UE, LE and/or trunk, initial orthotic(s) encounter (timed): assessment and fitting to improve positioning of lower extremity during weight bearing and gait, improve pressure distribution of the plantar aspect of the foot, improve upper extremity performance, improve

## 2025-06-03 ENCOUNTER — APPOINTMENT (OUTPATIENT)
Facility: HOSPITAL | Age: 75
End: 2025-06-03
Payer: MEDICARE

## 2025-06-04 ENCOUNTER — APPOINTMENT (OUTPATIENT)
Facility: HOSPITAL | Age: 75
End: 2025-06-04
Payer: MEDICARE

## 2025-06-05 ENCOUNTER — HOSPITAL ENCOUNTER (OUTPATIENT)
Facility: HOSPITAL | Age: 75
Setting detail: RECURRING SERIES
Discharge: HOME | End: 2025-06-08
Payer: MEDICARE

## 2025-06-05 PROCEDURE — 97140 MANUAL THERAPY 1/> REGIONS: CPT

## 2025-06-05 NOTE — PROGRESS NOTES
decrease feeling of limb heaviness, and increase independence/tolerance for walking x 10 min, shoe fit, and to allow for best fit for maintenance garment within 6 weeks       Long Term Goals: To be accomplished in 24 treatments.  Patient/caregiver will demonstrate improved edema management as evidenced by performing donning/doffing of garments modified independent 3/3 times within session to aid in reducing risk for infection and promote transition to maintenance phase of CDT in 12 weeks. (Measured for garments 4/29/25)  2.   Patient will demonstrate decrease in self-perceived functional impairment as evidenced by improved score on Lymphedema Life Impact Scale outcome measure from 34% impairment to 24% impairment within 12 weeks.       PLAN  Yes  Continue plan of care  Re-Cert Due: 08/31/25  PN due: 7/2/25  []  Upgrade activities as tolerated  []  Discharge due to:  [x]  Other: fit garments upon receiving      Amanda Rock, PT, DPT, CLT       6/5/2025       11:59 AM

## 2025-06-06 ENCOUNTER — APPOINTMENT (OUTPATIENT)
Facility: HOSPITAL | Age: 75
End: 2025-06-06
Payer: MEDICARE

## 2025-06-10 ENCOUNTER — APPOINTMENT (OUTPATIENT)
Facility: HOSPITAL | Age: 75
End: 2025-06-10
Payer: MEDICARE

## 2025-06-11 ENCOUNTER — APPOINTMENT (OUTPATIENT)
Facility: HOSPITAL | Age: 75
End: 2025-06-11
Payer: MEDICARE

## 2025-06-11 ENCOUNTER — HOSPITAL ENCOUNTER (OUTPATIENT)
Facility: HOSPITAL | Age: 75
Setting detail: RECURRING SERIES
Discharge: HOME | End: 2025-06-14
Payer: MEDICARE

## 2025-06-11 PROCEDURE — 97140 MANUAL THERAPY 1/> REGIONS: CPT

## 2025-06-11 NOTE — PROGRESS NOTES
[facility]   a part of [facility]  [address], [address]   [place] [address]   Phone: [phone]  Fax: [phone]      OCCUPATIONAL THERAPY PROGRESS NOTE  Patient Name:  Yoav Ghosh Sr. :  1950   Treatment/Medical Diagnosis:  Lymphedema, not elsewhere classified [I89.0]   Referral Source:  Faustino Campbell*     Date of Initial Visit:  2025 Attended Visits:  2 Missed Visits:  0     SUMMARY OF TREATMENT/ASSESSMENT:    Pt has been receiving PT/lymphedema care in the [facility].  OT/lymphedema will now be primary service until his custom garments arrive.  Today was the 2nd treatment visit since his evaluation:      Pt with good tolerance to MLD and  bilateral LE knee high bandaging this visit. Continued improvement in swelling at dorsal foot, noting tissue softening, skin less taught, improved shoe fit.  Reviewed bandaging precautions and advised pt to remove bandages with any increased SOB, pain, numbness/tingling. Pt measured for custom knee high stockings  by his primary lymph therapist/PT. Will continue MLB 2x/week until pt receives garments for fitting. Pt measured for daytime garments only due to cost since pt will have 20% coinsurance. Pt information previously submitted to [facility] to check insurance benefits for pump as well.       Volumetric Measurements:      Date:  Right Left % difference    25 10,830 10,035 7.92   25 10,496.71 9,938.81 5.61   3/3/25 10,649.34 9,719.82 9.56           CURRENT STATUS/GOALS  New goals established with OT POC  (2025):        Short Term Goals: To be accomplished in 12 treatments.  Ongoing   2025  1. Patient will independently identify at least 4 practices of proper skin care/infection prevention to reduce risk of infections and wounds in 12 visits.  2. Patient will demonstrate independence in lymphedema home program of therapeutic exercises and self MLD  to improve 
services to address swelling, analyze compression product fit and use, instruct in home lymphedema management program, measure for compression products, and modify and progress therapeutic interventions to address functional deficits and attain remaining goals.    Progress toward goals / Updated goals:  [x]  See Progress Note/Recertification  New goals:    Short Term Goals: To be accomplished in 12 treatments.  Ongoing   6/11/2025  1. Patient will independently identify at least 4 practices of proper skin care/infection prevention to reduce risk of infections and wounds in 12 visits.  2. Patient will demonstrate independence in lymphedema home program of therapeutic exercises and self MLD  to improve circulation and decongest limb and for increased independence/tolerance for functional activities within 12 visits.      Long Term Goals: To be accomplished in 24 treatments.  Patient will participate in an pneumatic pump demonstration to assist with long term management of lymphedema symptoms   3. Patient will tolerate wearing MLB/compression garment  to improve circulation and decongest limb and for increased independence/tolerance for functional activities within 12 visits.           PLAN  Yes  Continue plan of care  Certification Period: 5/8/2025 - 08/06/25   Progress Note:    7/11/2025  []  Upgrade activities as tolerated  []  Discharge due to:  []  Other:      Fransisca Ring OT       6/11/2025       3:15 PM

## 2025-06-13 ENCOUNTER — APPOINTMENT (OUTPATIENT)
Facility: HOSPITAL | Age: 75
End: 2025-06-13
Payer: MEDICARE

## 2025-06-13 ENCOUNTER — HOSPITAL ENCOUNTER (OUTPATIENT)
Facility: HOSPITAL | Age: 75
Setting detail: RECURRING SERIES
Discharge: HOME | End: 2025-06-16
Payer: MEDICARE

## 2025-06-13 PROCEDURE — 97140 MANUAL THERAPY 1/> REGIONS: CPT

## 2025-06-13 NOTE — PROGRESS NOTES
PHYSICAL THERAPY/OCCUPATIONAL THERAPY - MEDICARE DAILY TREATMENT NOTE (updated 3/23)      Date: 2025          Patient Name:  Yoav Ghosh Sr. :  1950   Medical   Diagnosis:  Lymphedema, not elsewhere classified [I89.0] Treatment Diagnosis:  I89.0     Lymphedema, not elsewhere classified    Referral Source:  Faustino Campbell* Insurance:   Payor: [facility] MEDICARE / Plan: [facility] HEALTHKEEPERS MEDIBLUE PLUS / Product Type: *No Product type* /                     Patient  verified yes     Visit #   Current  / Total 3 24   Time   In / Out 1220 1315   Total Treatment Time 55   Total Timed Codes 55   1:1 Treatment Time 55      [facility] Totals Reminder:  bill using total billable   min of TIMED therapeutic procedures and modalities.   8-22 min = 1 unit; 23-37 min = 2 units; 38-52 min = 3 units;  53-67 min = 4 units; 68-82 min = 5 units         SUBJECTIVE  If an interpreting service was utilized for treatment of this patient, the contents of this document represent the material reviewed with the patient via the .     Pain Level (0-10 scale): No reported pain     Any medication changes, allergies to medications, adverse drug reactions, diagnosis change, or new procedure performed?: [x] No    [] Yes (see summary sheet for update)  Medications: Verified on Patient Summary List    Subjective functional status/changes:     Pleasant and cooperative; no acute issues.  Pt awaiting approval and delivery of his compression garments    OBJECTIVE      Therapeutic Procedures:  Tx Min Billable or 1:1 Min (if diff from Tx Min) Procedure, Rationale, Specifics   55 59 [id number] Manual Therapy (timed):  decrease edema to improve patient's ability to progress to PLOF and address remaining functional goals.  The manual therapy interventions were performed at a separate and distinct time from the therapeutic activities interventions . (see flow sheet as applicable)    Details if applicable:    The patient was

## 2025-06-16 ENCOUNTER — APPOINTMENT (OUTPATIENT)
Facility: HOSPITAL | Age: 75
End: 2025-06-16
Payer: MEDICARE

## 2025-06-16 ENCOUNTER — HOSPITAL ENCOUNTER (OUTPATIENT)
Facility: HOSPITAL | Age: 75
Setting detail: RECURRING SERIES
Discharge: HOME | End: 2025-06-19
Payer: MEDICARE

## 2025-06-16 PROCEDURE — 97140 MANUAL THERAPY 1/> REGIONS: CPT

## 2025-06-16 NOTE — PROGRESS NOTES
PHYSICAL THERAPY/OCCUPATIONAL THERAPY - MEDICARE DAILY TREATMENT NOTE (updated 3/23)      Date: 2025          Patient Name:  Yoav Ghosh Sr. :  1950   Medical   Diagnosis:  Lymphedema, not elsewhere classified [I89.0] Treatment Diagnosis:  I89.0     Lymphedema, not elsewhere classified    Referral Source:  Faustino Campbell* Insurance:   Payor: [facility] MEDICARE / Plan: [facility] [employer] MEDIBLUE PLUS / Product Type: *No Product type* /                     Patient  verified yes     Visit #   Current  / Total 4 24   Time   In / Out 1015 1115   Total Treatment Time 60   Total Timed Codes 60   1:1 Treatment Time 60      [facility] Totals Reminder:  bill using total billable   min of TIMED therapeutic procedures and modalities.   8-22 min = 1 unit; 23-37 min = 2 units; 38-52 min = 3 units;  53-67 min = 4 units; 68-82 min = 5 units         SUBJECTIVE  If an interpreting service was utilized for treatment of this patient, the contents of this document represent the material reviewed with the patient via the .     Pain Level (0-10 scale): No reported pain     Any medication changes, allergies to medications, adverse drug reactions, diagnosis change, or new procedure performed?: [x] No    [] Yes (see summary sheet for update)  Medications: Verified on Patient Summary List    Subjective functional status/changes:     Pleasant and cooperative; Pt c/o itchy skin with last wrapping.  Ed[place]ear not used today.    Pt awaiting approval and delivery of his compression garments    OBJECTIVE      Therapeutic Procedures:  Tx Min Billable or 1:1 Min (if diff from Tx Min) Procedure, Rationale, Specifics   60 60 [id number] Manual Therapy (timed):  decrease edema to improve patient's ability to progress to PLOF and address remaining functional goals.  The manual therapy interventions were performed at a separate and distinct time from the therapeutic activities interventions . (see flow sheet as

## 2025-06-17 ENCOUNTER — APPOINTMENT (OUTPATIENT)
Facility: HOSPITAL | Age: 75
End: 2025-06-17
Payer: MEDICARE

## 2025-06-19 ENCOUNTER — HOSPITAL ENCOUNTER (OUTPATIENT)
Facility: HOSPITAL | Age: 75
Setting detail: RECURRING SERIES
Discharge: HOME | End: 2025-06-22
Payer: MEDICARE

## 2025-06-19 ENCOUNTER — APPOINTMENT (OUTPATIENT)
Facility: HOSPITAL | Age: 75
End: 2025-06-19
Payer: MEDICARE

## 2025-06-19 PROCEDURE — 97140 MANUAL THERAPY 1/> REGIONS: CPT

## 2025-06-19 NOTE — PROGRESS NOTES
PHYSICAL THERAPY/OCCUPATIONAL THERAPY - MEDICARE DAILY TREATMENT NOTE (updated 3/23)      Date: 2025          Patient Name:  Yoav Ghosh Sr. :  1950   Medical   Diagnosis:  Lymphedema, not elsewhere classified [I89.0] Treatment Diagnosis:  I89.0     Lymphedema, not elsewhere classified    Referral Source:  Faustino Campbell* Insurance:   Payor: [facility] MEDICARE / Plan: [facility] [employer] MEDIBLUE PLUS / Product Type: *No Product type* /                     Patient  verified yes     Visit #   Current  / Total 5 24   Time   In / Out 1130 1250   Total Treatment Time 80   Total Timed Codes 80   1:1 Treatment Time 80      [facility] Totals Reminder:  bill using total billable   min of TIMED therapeutic procedures and modalities.   8-22 min = 1 unit; 23-37 min = 2 units; 38-52 min = 3 units;  53-67 min = 4 units; 68-82 min = 5 units         SUBJECTIVE  If an interpreting service was utilized for treatment of this patient, the contents of this document represent the material reviewed with the patient via the .     Pain Level (0-10 scale): No reported pain     Any medication changes, allergies to medications, adverse drug reactions, diagnosis change, or new procedure performed?: [x] No    [] Yes (see summary sheet for update)  Medications: Verified on Patient Summary List    Subjective functional status/changes:     Pleasant and cooperative; No acute issues.   Pt awaiting approval and delivery of his compression garments    OBJECTIVE      Therapeutic Procedures:  Tx Min Billable or 1:1 Min (if diff from Tx Min) Procedure, Rationale, Specifics   80 80 [id number] Manual Therapy (timed):  decrease edema to improve patient's ability to progress to PLOF and address remaining functional goals.  The manual therapy interventions were performed at a separate and distinct time from the therapeutic activities interventions . (see flow sheet as applicable)    Details if applicable:    The patient

## 2025-06-20 ENCOUNTER — APPOINTMENT (OUTPATIENT)
Facility: HOSPITAL | Age: 75
End: 2025-06-20
Payer: MEDICARE

## 2025-06-23 ENCOUNTER — APPOINTMENT (OUTPATIENT)
Facility: HOSPITAL | Age: 75
End: 2025-06-23
Payer: MEDICARE

## 2025-06-25 ENCOUNTER — APPOINTMENT (OUTPATIENT)
Facility: HOSPITAL | Age: 75
End: 2025-06-25
Payer: MEDICARE

## 2025-06-25 ENCOUNTER — HOSPITAL ENCOUNTER (OUTPATIENT)
Facility: HOSPITAL | Age: 75
Setting detail: RECURRING SERIES
Discharge: HOME | End: 2025-06-28
Payer: MEDICARE

## 2025-06-25 PROCEDURE — 97140 MANUAL THERAPY 1/> REGIONS: CPT

## 2025-06-25 PROCEDURE — 97760 ORTHOTIC MGMT&TRAING 1ST ENC: CPT

## 2025-06-25 NOTE — PROGRESS NOTES
PHYSICAL THERAPY/OCCUPATIONAL THERAPY - MEDICARE DAILY TREATMENT NOTE (updated 3/23)      Date: 2025          Patient Name:  Yoav Ghosh Sr. :  1950   Medical   Diagnosis:  Lymphedema, not elsewhere classified [I89.0] Treatment Diagnosis:  I89.0     Lymphedema, not elsewhere classified    Referral Source:  Faustino Campbell* Insurance:   Payor: [facility] MEDICARE / Plan: [facility] [employer] MEDIBLUE PLUS / Product Type: *No Product type* /                     Patient  verified yes     Visit #   Current  / Total 6 24   Time   In / Out 1130 1230   Total Treatment Time 60   Total Timed Codes 55   1:1 Treatment Time 55      [facility] Totals Reminder:  bill using total billable   min of TIMED therapeutic procedures and modalities.   8-22 min = 1 unit; 23-37 min = 2 units; 38-52 min = 3 units;  53-67 min = 4 units; 68-82 min = 5 units         SUBJECTIVE  If an interpreting service was utilized for treatment of this patient, the contents of this document represent the material reviewed with the patient via the .     Pain Level (0-10 scale): No reported pain     Any medication changes, allergies to medications, adverse drug reactions, diagnosis change, or new procedure performed?: [x] No    [] Yes (see summary sheet for update)  Medications: Verified on Patient Summary List    Subjective functional status/changes:     Pleasant and cooperative; No acute issues.   Pt arrives today with his compression stockings and is excited to try them on     OBJECTIVE      Therapeutic Procedures:  Tx Min Billable or 1:1 Min (if diff from Tx Min) Procedure, Rationale, Specifics   30 30 08228 Manual Therapy (timed):  decrease edema to improve patient's ability to progress to PLOF and address remaining functional goals.  The manual therapy interventions were performed at a separate and distinct time from the therapeutic activities interventions . (see flow sheet as applicable)    Details if applicable:

## 2025-06-26 ENCOUNTER — APPOINTMENT (OUTPATIENT)
Facility: HOSPITAL | Age: 75
End: 2025-06-26
Payer: MEDICARE

## 2025-06-30 ENCOUNTER — APPOINTMENT (OUTPATIENT)
Facility: HOSPITAL | Age: 75
End: 2025-06-30
Payer: MEDICARE

## 2025-06-30 ENCOUNTER — HOSPITAL ENCOUNTER (OUTPATIENT)
Facility: HOSPITAL | Age: 75
Setting detail: RECURRING SERIES
Discharge: HOME | End: 2025-07-03
Payer: MEDICARE

## 2025-06-30 PROCEDURE — 97140 MANUAL THERAPY 1/> REGIONS: CPT

## 2025-06-30 NOTE — PROGRESS NOTES
PHYSICAL THERAPY/OCCUPATIONAL THERAPY - MEDICARE DAILY TREATMENT NOTE (updated 3/23)      Date: 2025          Patient Name:  Yoav Ghosh Sr. :  1950   Medical   Diagnosis:  No admission diagnoses are documented for this encounter. Treatment Diagnosis:  I89.0     Lymphedema, not elsewhere classified    Referral Source:  Faustino Campbell* Insurance:   Payor: [facility] MEDICARE / Plan: [facility] [employer] MEDIBLUE PLUS / Product Type: *No Product type* /                     Patient  verified yes     Visit #   Current  / Total 6 24   Time   In / Out 1005 1125   Total Treatment Time 80   Total Timed Codes 80   1:1 Treatment Time 80      [facility] Totals Reminder:  bill using total billable   min of TIMED therapeutic procedures and modalities.   8-22 min = 1 unit; 23-37 min = 2 units; 38-52 min = 3 units;  53-67 min = 4 units; 68-82 min = 5 units         SUBJECTIVE  If an interpreting service was utilized for treatment of this patient, the contents of this document represent the material reviewed with the patient via the .     Pain Level (0-10 scale): No reported pain     Any medication changes, allergies to medications, adverse drug reactions, diagnosis change, or new procedure performed?: [x] No    [] Yes (see summary sheet for update)  Medications: Verified on Patient Summary List    Subjective functional status/changes:     Pleasant and cooperative; Pt arrived wearing his compression garments.  He states they fit well, but his feet swelled in the garments.   OT reviewed with patient the importance of donning garments first thing in morning to prevent swelling of the legs/feet.  He agreed and stated he did not put them on first thing today.      OT and pt discussed nighttime garments; pt would benefit from nighttime garments to reduce risk of increase volumes at night.  Pt agreed.  OT to apply for [employer] on behalf of the patient for nighttime garments.

## 2025-07-03 ENCOUNTER — HOSPITAL ENCOUNTER (OUTPATIENT)
Facility: HOSPITAL | Age: 75
Setting detail: RECURRING SERIES
Discharge: HOME | End: 2025-07-06
Payer: MEDICARE

## 2025-07-03 ENCOUNTER — APPOINTMENT (OUTPATIENT)
Facility: HOSPITAL | Age: 75
End: 2025-07-03
Payer: MEDICARE

## 2025-07-03 PROCEDURE — 97140 MANUAL THERAPY 1/> REGIONS: CPT

## 2025-07-03 NOTE — PROGRESS NOTES
PHYSICAL THERAPY/OCCUPATIONAL THERAPY - MEDICARE DAILY TREATMENT NOTE (updated 3/23)      Date: 7/3/2025          Patient Name:  Yoav Ghosh Sr. :  1950   Medical   Diagnosis:  Lymphedema, not elsewhere classified [I89.0] Treatment Diagnosis:  I89.0     Lymphedema, not elsewhere classified    Referral Source:  Faustino Campbell* Insurance:   Payor: [facility] MEDICARE / Plan: [facility] [employer] MEDIBLUE PLUS / Product Type: *No Product type* /                     Patient  verified yes     Visit #   Current  / Total 7 24   Time   In / Out 0990 1240   Total Treatment Time 45   Total Timed Codes 45   1:1 Treatment Time 45      [facility] Totals Reminder:  bill using total billable   min of TIMED therapeutic procedures and modalities.   8-22 min = 1 unit; 23-37 min = 2 units; 38-52 min = 3 units;  53-67 min = 4 units; 68-82 min = 5 units         SUBJECTIVE  If an interpreting service was utilized for treatment of this patient, the contents of this document represent the material reviewed with the patient via the .     Pain Level (0-10 scale): No reported pain     Any medication changes, allergies to medications, adverse drug reactions, diagnosis change, or new procedure performed?: [x] No    [] Yes (see summary sheet for update)  Medications: Verified on Patient Summary List    Subjective functional status/changes:     Pleasant and cooperative;   He removed his bandages this morning.  Pt arrived wearing his compression garments.  He states they fit well, but his feet swelled in the garments.  OT and pt discussed nighttime garments; pt would benefit from nighttime garments to reduce risk of increase volumes at night.  Pt agreed.  OT to apply for [employer] on behalf of the patient for nighttime garments.       OBJECTIVE      Therapeutic Procedures:  Tx Min Billable or 1:1 Min (if diff from Tx Min) Procedure, Rationale, Specifics   45 19 [id number] Manual Therapy (timed):  decrease edema to

## 2025-07-08 ENCOUNTER — HOSPITAL ENCOUNTER (OUTPATIENT)
Facility: HOSPITAL | Age: 75
Setting detail: RECURRING SERIES
Discharge: HOME | End: 2025-07-11
Payer: MEDICARE

## 2025-07-08 PROCEDURE — 97535 SELF CARE MNGMENT TRAINING: CPT

## 2025-07-08 NOTE — PROGRESS NOTES
PHYSICAL THERAPY/OCCUPATIONAL THERAPY - MEDICARE DAILY TREATMENT NOTE (updated 3/23)      Date: 2025          Patient Name:  Yoav Ghosh Sr. :  1950   Medical   Diagnosis:  No admission diagnoses are documented for this encounter. Treatment Diagnosis:  I89.0     Lymphedema, not elsewhere classified    Referral Source:  Faustino Campbell* Insurance:   Payor: [facility] MEDICARE / Plan: [facility] [employer]EEPERS MEDIBLUE PLUS / Product Type: *No Product type* /                     Patient  verified yes     Visit #   Current  / Total 8 24   Time   In / Out 1000 1110   Total Treatment Time 70   Total Timed Codes 10   1:1 Treatment Time 10      [facility] Totals Reminder:  bill using total billable   min of TIMED therapeutic procedures and modalities.   8-22 min = 1 unit; 23-37 min = 2 units; 38-52 min = 3 units;  53-67 min = 4 units; 68-82 min = 5 units         SUBJECTIVE  If an interpreting service was utilized for treatment of this patient, the contents of this document represent the material reviewed with the patient via the .     Pain Level (0-10 scale): No reported pain     Any medication changes, allergies to medications, adverse drug reactions, diagnosis change, or new procedure performed?: [x] No    [] Yes (see summary sheet for update)  Medications: Verified on Patient Summary List    Subjective functional status/changes:     Pleasant and cooperative;   He has been wearing his compression garments routinely; able to don/doff without issue.       OBJECTIVE      Therapeutic Procedures:  Tx Min Billable or 1:1 Min (if diff from Tx Min) Procedure, Rationale, Specifics   10 10 [id number] Self Care/Home Management (timed):  improve patient knowledge and understanding of lymphedema education   to improve patient's ability to progress to PLOF and address remaining functional goals.  (see flow sheet as applicable)    Details if applicable:    OT and pt reviewed the importance of exercise as

## 2025-07-10 ENCOUNTER — APPOINTMENT (OUTPATIENT)
Facility: HOSPITAL | Age: 75
End: 2025-07-10
Payer: MEDICARE

## 2025-07-14 ENCOUNTER — APPOINTMENT (OUTPATIENT)
Facility: HOSPITAL | Age: 75
End: 2025-07-14
Payer: MEDICARE

## 2025-07-17 ENCOUNTER — APPOINTMENT (OUTPATIENT)
Facility: HOSPITAL | Age: 75
End: 2025-07-17
Payer: MEDICARE

## 2025-07-22 ENCOUNTER — APPOINTMENT (OUTPATIENT)
Facility: HOSPITAL | Age: 75
End: 2025-07-22
Payer: MEDICARE

## 2025-07-25 ENCOUNTER — APPOINTMENT (OUTPATIENT)
Facility: HOSPITAL | Age: 75
End: 2025-07-25
Payer: MEDICARE